# Patient Record
Sex: FEMALE | Employment: STUDENT | URBAN - METROPOLITAN AREA
[De-identification: names, ages, dates, MRNs, and addresses within clinical notes are randomized per-mention and may not be internally consistent; named-entity substitution may affect disease eponyms.]

---

## 2023-11-14 ENCOUNTER — OFFICE VISIT (OUTPATIENT)
Dept: PRIMARY CARE | Facility: CLINIC | Age: 28
End: 2023-11-14
Payer: MEDICAID

## 2023-11-14 VITALS
BODY MASS INDEX: 20.05 KG/M2 | OXYGEN SATURATION: 98 % | HEART RATE: 82 BPM | DIASTOLIC BLOOD PRESSURE: 64 MMHG | HEIGHT: 61 IN | SYSTOLIC BLOOD PRESSURE: 98 MMHG | WEIGHT: 106.2 LBS

## 2023-11-14 DIAGNOSIS — R30.0 DYSURIA: ICD-10-CM

## 2023-11-14 DIAGNOSIS — Z11.1 SCREENING FOR TUBERCULOSIS: ICD-10-CM

## 2023-11-14 DIAGNOSIS — B37.31 VULVOVAGINAL CANDIDIASIS: Primary | ICD-10-CM

## 2023-11-14 PROCEDURE — 87086 URINE CULTURE/COLONY COUNT: CPT

## 2023-11-14 PROCEDURE — 99202 OFFICE O/P NEW SF 15 MIN: CPT | Performed by: INTERNAL MEDICINE

## 2023-11-14 PROCEDURE — 1036F TOBACCO NON-USER: CPT | Performed by: INTERNAL MEDICINE

## 2023-11-14 PROCEDURE — 36415 COLL VENOUS BLD VENIPUNCTURE: CPT

## 2023-11-14 PROCEDURE — 86481 TB AG RESPONSE T-CELL SUSP: CPT

## 2023-11-14 PROCEDURE — 81001 URINALYSIS AUTO W/SCOPE: CPT

## 2023-11-14 RX ORDER — DOXYLAMINE SUCCINATE 25 MG
TABLET ORAL 2 TIMES DAILY
Qty: 4 G | Refills: 2 | Status: SHIPPED | OUTPATIENT
Start: 2023-11-14 | End: 2023-11-19

## 2023-11-14 ASSESSMENT — PATIENT HEALTH QUESTIONNAIRE - PHQ9
1. LITTLE INTEREST OR PLEASURE IN DOING THINGS: NOT AT ALL
SUM OF ALL RESPONSES TO PHQ9 QUESTIONS 1 AND 2: 0
2. FEELING DOWN, DEPRESSED OR HOPELESS: NOT AT ALL

## 2023-11-14 NOTE — PROGRESS NOTES
"Patient ID: Jaqueline Tiwari is a 28 y.o. female who presents for blood test (Needs tb test for school) and Vaginal Itching.    BP 98/64   Pulse 82   Ht 1.543 m (5' 0.75\")   Wt 48.2 kg (106 lb 3.2 oz)   LMP 11/12/2023   SpO2 98%   BMI 20.23 kg/m²     Vaginal Itching          I am having itching in pelvic area , no significant redness  I dont see any drainage or discharge         I also need BLOOD TB test           Subjective     Review of Systems   Genitourinary:         Vaginal itching    All other systems reviewed and are negative.      Objective     Physical Exam  Vitals and nursing note reviewed.         No results found for: \"WBC\", \"HGB\", \"HCT\", \"MCV\", \"PLT\"        Problem List Items Addressed This Visit    None  Visit Diagnoses       Vulvovaginal candidiasis    -  Primary    Relevant Medications    miconazole (Micatin) 2 % cream    Dysuria        Relevant Orders    Urinalysis with Reflex Microscopic    Urine Culture    Screening for tuberculosis        Relevant Orders    T-Spot TB                 A/P         MICONAZOLE 2% CREAM APPLY TOPICALLY  TO THE AFFECTED AREA   URINALYSIS   URINE CULTURE   T SPOT TB TEST   I TOLD HER I DONT DO PELVIC EXAM   "

## 2023-11-15 LAB
APPEARANCE UR: ABNORMAL
BACTERIA UR CULT: NORMAL
BILIRUB UR STRIP.AUTO-MCNC: NEGATIVE MG/DL
COLOR UR: YELLOW
GLUCOSE UR STRIP.AUTO-MCNC: NEGATIVE MG/DL
KETONES UR STRIP.AUTO-MCNC: NEGATIVE MG/DL
LEUKOCYTE ESTERASE UR QL STRIP.AUTO: NEGATIVE
MUCOUS THREADS #/AREA URNS AUTO: ABNORMAL /LPF
NITRITE UR QL STRIP.AUTO: NEGATIVE
PH UR STRIP.AUTO: 6 [PH]
PROT UR STRIP.AUTO-MCNC: NEGATIVE MG/DL
RBC # UR STRIP.AUTO: ABNORMAL /UL
RBC #/AREA URNS AUTO: ABNORMAL /HPF
SP GR UR STRIP.AUTO: 1.02
SQUAMOUS #/AREA URNS AUTO: ABNORMAL /HPF
UROBILINOGEN UR STRIP.AUTO-MCNC: 2 MG/DL
WBC #/AREA URNS AUTO: ABNORMAL /HPF

## 2023-11-16 LAB
NIL(NEG) CONTROL SPOT COUNT: NORMAL
PANEL A SPOT COUNT: 0
PANEL B SPOT COUNT: 0
POS CONTROL SPOT COUNT: NORMAL
T-SPOT. TB INTERPRETATION: NEGATIVE

## 2023-11-30 ENCOUNTER — OFFICE VISIT (OUTPATIENT)
Dept: OBSTETRICS AND GYNECOLOGY | Facility: CLINIC | Age: 28
End: 2023-11-30
Payer: MEDICAID

## 2023-11-30 VITALS
DIASTOLIC BLOOD PRESSURE: 70 MMHG | SYSTOLIC BLOOD PRESSURE: 110 MMHG | BODY MASS INDEX: 20.39 KG/M2 | WEIGHT: 108 LBS | HEIGHT: 61 IN

## 2023-11-30 DIAGNOSIS — N76.0 ACUTE VAGINITIS: Primary | ICD-10-CM

## 2023-11-30 PROCEDURE — 99385 PREV VISIT NEW AGE 18-39: CPT

## 2023-11-30 PROCEDURE — 87205 SMEAR GRAM STAIN: CPT

## 2023-11-30 PROCEDURE — 1036F TOBACCO NON-USER: CPT

## 2023-11-30 ASSESSMENT — PAIN SCALES - GENERAL: PAINLEVEL: 0-NO PAIN

## 2023-11-30 NOTE — PROGRESS NOTES
Subjective   Jaqueline Tiwari is a 28 y.o. female who complains of vaginal discharge/itch.    HPI:  Symptoms reported: vaginal discharge and vaginal itching  Onset:  a couple of weeks ago  Course: persistent  Progression: improved    Helpful treatments:  patient was prescribed cream from PCP. Helped a little but not she is still feeling discomfort. .   Unhelpful treatments tried: none    Objective   Physical Exam:   Physical Exam  Constitutional:       Appearance: Normal appearance.   Pulmonary:      Effort: Pulmonary effort is normal.   Neurological:      Mental Status: She is alert and oriented to person, place, and time.   Psychiatric:         Mood and Affect: Mood normal.         Behavior: Behavior normal.         Thought Content: Thought content normal.         Judgment: Judgment normal.     Assessment/Plan   Diagnoses and all orders for this visit:  Acute vaginitis  -     Vaginitis Gram Stain For Bacterial Vaginosis + Yeast; Future    Plan to wait to treat until tests come back.   Encouraged to call if symptoms worsen or do not get better after treatment   Encouraged annual exam, patient plans to schedule one.   ROSA Badillo

## 2023-12-01 LAB
CLUE CELLS VAG LPF-#/AREA: NORMAL /[LPF]
NUGENT SCORE: 3
YEAST VAG WET PREP-#/AREA: NORMAL

## 2023-12-05 ENCOUNTER — TELEPHONE (OUTPATIENT)
Dept: OBSTETRICS AND GYNECOLOGY | Facility: CLINIC | Age: 28
End: 2023-12-05

## 2023-12-05 ENCOUNTER — APPOINTMENT (OUTPATIENT)
Dept: OBSTETRICS AND GYNECOLOGY | Facility: CLINIC | Age: 28
End: 2023-12-05
Payer: MEDICAID

## 2023-12-06 ENCOUNTER — APPOINTMENT (OUTPATIENT)
Dept: OBSTETRICS AND GYNECOLOGY | Facility: CLINIC | Age: 28
End: 2023-12-06
Payer: MEDICAID

## 2023-12-06 NOTE — TELEPHONE ENCOUNTER
Spoke with pt and verified by name and  reviewed pt neg lab results. Pt states she still having vaginal itching. Discussed soaps and perfumes. Pt will continue to monitor and if not resolved call back   Pt questions addressed

## 2024-02-05 DIAGNOSIS — R53.83 OTHER FATIGUE: Primary | ICD-10-CM

## 2024-02-05 DIAGNOSIS — D64.9 ANEMIA, UNSPECIFIED TYPE: ICD-10-CM

## 2024-04-01 DIAGNOSIS — L70.0 ACNE VULGARIS: Primary | ICD-10-CM

## 2024-04-01 RX ORDER — TRETINOIN 0.5 MG/G
CREAM TOPICAL NIGHTLY
Qty: 45 G | Refills: 2 | Status: SHIPPED | OUTPATIENT
Start: 2024-04-01 | End: 2025-04-01

## 2024-07-14 ENCOUNTER — APPOINTMENT (OUTPATIENT)
Dept: PRIMARY CARE | Facility: CLINIC | Age: 29
End: 2024-07-14
Payer: MEDICAID

## 2024-07-16 ENCOUNTER — TELEPHONE (OUTPATIENT)
Dept: PRIMARY CARE | Facility: CLINIC | Age: 29
End: 2024-07-16
Payer: MEDICAID

## 2024-07-26 ENCOUNTER — APPOINTMENT (OUTPATIENT)
Dept: PRIMARY CARE | Facility: CLINIC | Age: 29
End: 2024-07-26
Payer: MEDICAID

## 2024-08-08 ENCOUNTER — APPOINTMENT (OUTPATIENT)
Dept: PRIMARY CARE | Facility: CLINIC | Age: 29
End: 2024-08-08
Payer: MEDICAID

## 2024-08-08 VITALS
BODY MASS INDEX: 20.24 KG/M2 | DIASTOLIC BLOOD PRESSURE: 77 MMHG | WEIGHT: 107.2 LBS | SYSTOLIC BLOOD PRESSURE: 114 MMHG | OXYGEN SATURATION: 98 % | HEART RATE: 68 BPM | HEIGHT: 61 IN

## 2024-08-08 DIAGNOSIS — R21 FACIAL RASH: ICD-10-CM

## 2024-08-08 DIAGNOSIS — L70.9 ACNE, UNSPECIFIED ACNE TYPE: Primary | Chronic | ICD-10-CM

## 2024-08-08 PROCEDURE — 3008F BODY MASS INDEX DOCD: CPT | Performed by: INTERNAL MEDICINE

## 2024-08-08 PROCEDURE — 99212 OFFICE O/P EST SF 10 MIN: CPT | Performed by: INTERNAL MEDICINE

## 2024-08-08 PROCEDURE — 1036F TOBACCO NON-USER: CPT | Performed by: INTERNAL MEDICINE

## 2024-08-08 RX ORDER — AZELAIC ACID 0.15 G/G
1 GEL TOPICAL 2 TIMES DAILY
Qty: 50 G | Refills: 1 | Status: SHIPPED | OUTPATIENT
Start: 2024-08-08 | End: 2024-09-07

## 2024-08-08 RX ORDER — CLINDAMYCIN PHOSPHATE 10 MG/G
GEL TOPICAL 2 TIMES DAILY
Qty: 60 G | Refills: 1 | Status: SHIPPED | OUTPATIENT
Start: 2024-08-08 | End: 2024-09-07

## 2024-08-08 ASSESSMENT — PATIENT HEALTH QUESTIONNAIRE - PHQ9
SUM OF ALL RESPONSES TO PHQ9 QUESTIONS 1 AND 2: 0
2. FEELING DOWN, DEPRESSED OR HOPELESS: NOT AT ALL
1. LITTLE INTEREST OR PLEASURE IN DOING THINGS: NOT AT ALL

## 2024-08-08 ASSESSMENT — ENCOUNTER SYMPTOMS: CONSTITUTIONAL NEGATIVE: 1

## 2024-08-08 NOTE — PROGRESS NOTES
"Patient ID: Jaqueline Tiwari is a 28 y.o. female who presents for Rash (Rash on her face that went away but she wants a referral to Dermatology ).    /77   Pulse 68   Ht 1.549 m (5' 1\")   Wt 48.6 kg (107 lb 3.2 oz)   LMP 07/21/2024 (Exact Date)   SpO2 98%   BMI 20.26 kg/m²     HPI      PT IS HERE FOR EVALUATION OF RASH   ON HER FACE , THOUGH IT  GOT BETTER   REQUESTED REFFERAL TO DERMATOLOGY     SHE HAS FACIAL ACNE   AZELAIC ACID USED TO HELP       Subjective     Review of Systems   Constitutional: Negative.    All other systems reviewed and are negative.      Objective     Physical Exam  Vitals and nursing note reviewed.   Skin:     Comments: RT CHEEK   NODULAR ACNE NOTED          No results found for: \"WBC\", \"HGB\", \"HCT\", \"MCV\", \"PLT\"        Problem List Items Addressed This Visit    None  Visit Diagnoses       Acne, unspecified acne type  (Chronic)   -  Primary    Relevant Medications    clindamycin (Cleocin T) 1 % gel    azelaic acid (Finacea) 15 % gel    Facial rash        Relevant Orders    Referral to Dermatology             A/P         OFFERED MINOCYCLINE /DOXYCYCLINE PT   DEFFERED , SHE WANTS MORE TOPICAL PREP   AZELAIC ACID 15% GEL APPLY TOPICALLY   NEED TO WASH FACE WITH SOAP AND WATER   CLINDAMYCIN GEL 1% APPLY TOPICALLY   REFFERAL DERMATOLOGY   "